# Patient Record
Sex: MALE | Race: WHITE | NOT HISPANIC OR LATINO | Employment: FULL TIME | URBAN - METROPOLITAN AREA
[De-identification: names, ages, dates, MRNs, and addresses within clinical notes are randomized per-mention and may not be internally consistent; named-entity substitution may affect disease eponyms.]

---

## 2020-11-03 ENCOUNTER — DOCUMENTATION ONLY (OUTPATIENT)
Dept: HEMATOLOGY/ONCOLOGY | Facility: CLINIC | Age: 60
End: 2020-11-03

## 2020-11-03 ENCOUNTER — TELEPHONE (OUTPATIENT)
Dept: HEMATOLOGY/ONCOLOGY | Facility: CLINIC | Age: 60
End: 2020-11-03

## 2020-11-03 NOTE — TELEPHONE ENCOUNTER
----- Message from Preet Guajardo sent at 11/3/2020 12:58 PM CST -----  Regarding: [facility] REFERRAL  Contact: [facility] REFERRAL  VA referral/records and auth scanned into      CC: Stage ll (T1N1M0) Non-keratinizing EBV-,p16+ Nasopharyngeal Squamous Cell Carcinoma .. S/P concurrent chemoXRT(2/3 doses Cisplatin) competed 08/31/20    Cancer of nasopharynx/has peg tube that he is not using requesting removal       Pt can be reached [phone] (home) [phone](cell)      If needing additional records  contact:  Massiel Melendez (VA RN) - [phone]

## 2020-11-13 ENCOUNTER — TELEPHONE (OUTPATIENT)
Dept: HEMATOLOGY/ONCOLOGY | Facility: CLINIC | Age: 60
End: 2020-11-13

## 2020-11-13 NOTE — TELEPHONE ENCOUNTER
----- Message from Preet Guajardo sent at 11/13/2020 11:40 AM CST -----  Regarding: RESHEDULE MISSED APPT  Contact: [facility] REFERRAL  VA referral/records and auth scanned into        CC: Stage ll (T1N1M0) Non-keratinizing EBV-,p16+ Nasopharyngeal Squamous Cell Carcinoma .. S/P concurrent chemoXRT(2/3 doses Cisplatin) competed 08/31/20     Cancer of nasopharynx/has peg tube that he is not using requesting removal       Pt can be reached [phone] (home) [phone](cell)    Pt Gloria MORIN @ [phone]       If needing additional records  contact:   Massiel Melendez (VA RN) - [phone]

## 2020-11-19 ENCOUNTER — TELEPHONE (OUTPATIENT)
Dept: HEMATOLOGY/ONCOLOGY | Facility: CLINIC | Age: 60
End: 2020-11-19

## 2020-11-19 NOTE — TELEPHONE ENCOUNTER
Message routed to Ciara Mcmillan, RN Navigator for Dr. Tafoya in [place], as this is where authorization location is specified, and patient lives in [place].    ----- Message from Preet Guajardo sent at 11/19/2020 12:13 PM CST -----  Regarding: Reschedule appt  Contact: Boaz marino/[facility] referral/records and auth scanned into        CC: Stage ll (T1N1M0) Non-keratinizing EBV-,p16+ Nasopharyngeal Squamous Cell Carcinoma .. S/P concurrent chemoXRT(2/3 doses Cisplatin) competed 08/31/20      Cancer of nasopharynx/has peg tube that he is not using requesting removal         Pt can be reached [phone] (home) [phone](cell)     Pt Gloria MORIN @ [phone]         If needing additional records  contact:   Massiel Melendez ([facility] RN) - [phone]

## 2020-11-25 ENCOUNTER — OFFICE VISIT (OUTPATIENT)
Dept: HEMATOLOGY/ONCOLOGY | Facility: CLINIC | Age: 60
End: 2020-11-25
Payer: OTHER GOVERNMENT

## 2020-11-25 VITALS
HEART RATE: 74 BPM | RESPIRATION RATE: 18 BRPM | OXYGEN SATURATION: 99 % | WEIGHT: 129.31 LBS | DIASTOLIC BLOOD PRESSURE: 67 MMHG | TEMPERATURE: 98 F | SYSTOLIC BLOOD PRESSURE: 108 MMHG

## 2020-11-25 DIAGNOSIS — C76.0 HEAD AND NECK CANCER: Primary | ICD-10-CM

## 2020-11-25 PROCEDURE — 99999 PR PBB SHADOW E&M-EST. PATIENT-LVL III: ICD-10-PCS | Mod: PBBFAC,,, | Performed by: INTERNAL MEDICINE

## 2020-11-25 PROCEDURE — 99999 PR PBB SHADOW E&M-EST. PATIENT-LVL III: CPT | Mod: PBBFAC,,, | Performed by: INTERNAL MEDICINE

## 2020-11-25 PROCEDURE — 99205 OFFICE O/P NEW HI 60 MIN: CPT | Mod: S$PBB,,, | Performed by: INTERNAL MEDICINE

## 2020-11-25 PROCEDURE — 99213 OFFICE O/P EST LOW 20 MIN: CPT | Mod: PBBFAC,PN | Performed by: INTERNAL MEDICINE

## 2020-11-25 PROCEDURE — 99205 PR OFFICE/OUTPT VISIT, NEW, LEVL V, 60-74 MIN: ICD-10-PCS | Mod: S$PBB,,, | Performed by: INTERNAL MEDICINE

## 2020-11-25 RX ORDER — GABAPENTIN 600 MG/1
600 TABLET ORAL 2 TIMES DAILY
COMMUNITY

## 2020-11-25 RX ORDER — ESCITALOPRAM OXALATE 20 MG/1
20 TABLET ORAL DAILY
COMMUNITY

## 2020-11-25 RX ORDER — MELATONIN 3 MG
CAPSULE ORAL
COMMUNITY

## 2020-11-25 RX ORDER — TRAZODONE HYDROCHLORIDE 100 MG/1
100 TABLET ORAL NIGHTLY
COMMUNITY

## 2020-11-25 RX ORDER — ERGOCALCIFEROL 1.25 MG/1
50000 CAPSULE ORAL
COMMUNITY

## 2020-11-25 NOTE — PROGRESS NOTES
HPI    60 years old male with diagnosis of head and neck cancer status post treatment at [facility] with chemo radiation.  Patient started treatment in July of 2020 and completed treatment in September of 2020.  Since then patient has moved from [place] to [place].  Thus far patient has been follow-up with [facility] [place] with oncologist and ENT per patient.    Patient states that he does not want to be seen here at [place].  He only comes here because he thought that I am able to removed the Peg tube which he never used since placement.    Patient is tolerating oral intake.  Patient states that his weight is stable.    Patient cannot provide detailed oncology history.     He denies any chest pain shortness breath.  He denies any abdominal pain nausea vomiting or diarrhea.      Past Medical History:   Diagnosis Date    Dizziness     Hearing loss     PTSD (post-traumatic stress disorder)     Restless leg syndrome      Social History     Socioeconomic History    Marital status:      Spouse name: Not on file    Number of children: Not on file    Years of education: Not on file    Highest education level: Not on file   Occupational History    Not on file   Social Needs    Financial resource strain: Not on file    Food insecurity     Worry: Not on file     Inability: Not on file    Transportation needs     Medical: Not on file     Non-medical: Not on file   Tobacco Use    Smoking status: Current Every Day Smoker   Substance and Sexual Activity    Alcohol use: Yes     Comment: occasional     Drug use: No    Sexual activity: Not on file   Lifestyle    Physical activity     Days per week: Not on file     Minutes per session: Not on file    Stress: Not on file   Relationships    Social connections     Talks on phone: Not on file     Gets together: Not on file     Attends [employer] service: Not on file     Active member of club or organization: Not on file     Attends  meetings of clubs or organizations: Not on file     Relationship status: Not on file   Other Topics Concern    Not on file   Social History Narrative    Not on file         Subjective      Review of Systems   Constitutional: Negative for appetite change, fatigue and unexpected weight change.   HENT: Negative for mouth sores.   Eyes: Negative for visual disturbance.   Respiratory: Negative for cough and shortness of breath.   Cardiovascular: Negative for chest pain.   Gastrointestinal: Negative for diarrhea.   Genitourinary: Negative for frequency.   Musculoskeletal: Negative for back pain.   Skin: Negative for rash.   Neurological: Negative for headaches.   Hematological: Negative for adenopathy.   Psychiatric/Behavioral: The patient is not nervous/anxious.   All other systems reviewed and are negative.     Objective    Physical Exam   Vitals:    11/25/20 1042   BP: 108/67   Pulse: 74   Resp: 18   Temp: 98.4 °F (36.9 °C)       Awake alert no acute distress  Normal rate  Clear to auscultate  Soft nontender nondistended.  Peg tube mid epigastric  Move all 4 extremities  Distal pulses intact  Nonfocal cranial nerves 2-12 grossly intact  No rash no lesions   deferred    CMP  No results found for: NA, K, CL, CO2, GLU, BUN, CREATININE, CALCIUM, PROT, ALBUMIN, BILITOT, ALKPHOS, AST, ALT, ANIONGAP, ESTGFRAFRICA, EGFRNONAA  No results found for: WBC, HGB, HCT, MCV, PLT        Assessment Plan    60 years old  male referred to Hematology Oncology for prior diagnosis of malignancy neoplasm of lateral wall of nasopharynx.  Patient completed therapy on September 2020 which consists of chemo radiation.  It was reported that patient has stage II none Keratinizing EBV negative P16+ SCC of nasopharyngeal.  He finished 2/3 doses of cisplatin.  He is currently not using pegtube.  Wants to have peg tube removed.      I have asked him regarding future visit here with us at [facility] for continue surveillance due to  "diagnosis.  However patient states he does not wishes to seen oncologist.  He says he will do everything at [facility].     Patient only wants from this visit is to have his Peg tube removed. I have spoke to him about Pegtube is managed by surgeon.  I do not personally performed procedure such as this.  Patient's response was "then why I am here, I do not need to see you.  I am not coming back"    I have reach out to [facility] and give then my number for [facility] to call me back.  It is clear to me that patient does not wants to be seen at [place].      There are no diagnoses linked to this encounter.      "

## 2020-11-25 NOTE — LETTER
November 25, 2020      [facility]           [facility] [place] [id number] [address]  Phone: [phone]  Fax: [phone]          Patient: Morgan Quinteros Jr.   MR Number: [id number]   YOB: 1960   Date of Visit: 11/25/2020       Dear [facility]:    Thank you for referring Morgan Quinteros to me for evaluation. Attached you will find relevant portions of my assessment and plan of care.    If you have questions, please do not hesitate to call me. I look forward to following Morgan Quinteros along with you.    Sincerely,    Km Tafoya MD    Enclosure  CC:  No Recipients    If you would like to receive this communication electronically, please contact [email or web address] or [phone] to request more information on [facility] access.    For providers and/or their staff who would like to refer a patient to [facility], please contact us through our one-stop-shop provider referral line, Tony Senior, at [phone].    If you feel you have received this communication in error or would no longer like to receive these types of communications, please e-mail [email or web address]